# Patient Record
Sex: FEMALE | Race: WHITE | Employment: FULL TIME | URBAN - METROPOLITAN AREA
[De-identification: names, ages, dates, MRNs, and addresses within clinical notes are randomized per-mention and may not be internally consistent; named-entity substitution may affect disease eponyms.]

---

## 2022-04-12 DIAGNOSIS — Z78.0 MENOPAUSE: Primary | ICD-10-CM

## 2022-09-19 PROBLEM — Z87.42 HISTORY OF ABNORMAL CERVICAL PAP SMEAR: Status: ACTIVE | Noted: 2022-09-19

## 2022-09-19 PROBLEM — F41.9 ANXIETY: Status: ACTIVE | Noted: 2022-09-19

## 2022-09-19 PROBLEM — R32 URINARY INCONTINENCE: Status: ACTIVE | Noted: 2022-09-19

## 2022-09-19 PROBLEM — Z79.890 HORMONE REPLACEMENT THERAPY (HRT): Status: ACTIVE | Noted: 2022-09-19

## 2022-09-19 PROBLEM — A63.0 GENITAL WARTS: Status: ACTIVE | Noted: 2022-09-19

## 2022-09-19 PROBLEM — F32.A DEPRESSION: Status: ACTIVE | Noted: 2022-09-19

## 2022-09-19 RX ORDER — PANTOPRAZOLE SODIUM 40 MG/1
40 TABLET, DELAYED RELEASE ORAL 2 TIMES DAILY
COMMUNITY
Start: 2022-03-31

## 2022-09-19 RX ORDER — CLONAZEPAM 1 MG/1
1 TABLET ORAL NIGHTLY
COMMUNITY
Start: 2022-03-31

## 2022-09-19 RX ORDER — ESTRADIOL 1 MG/1
1 TABLET ORAL DAILY
COMMUNITY
Start: 2022-03-31

## 2022-09-19 RX ORDER — SERTRALINE HYDROCHLORIDE 100 MG/1
100 TABLET, FILM COATED ORAL 2 TIMES DAILY
COMMUNITY
Start: 2022-03-31

## 2022-09-19 RX ORDER — TRAMADOL HYDROCHLORIDE 50 MG/1
TABLET ORAL
COMMUNITY
Start: 2022-08-25

## 2022-09-19 RX ORDER — PROGESTERONE 100 MG/1
100 CAPSULE ORAL DAILY
COMMUNITY
Start: 2022-03-31

## 2022-09-19 RX ORDER — MIRTAZAPINE 30 MG/1
TABLET, FILM COATED ORAL
COMMUNITY
Start: 2022-07-20 | End: 2022-09-20

## 2022-09-19 RX ORDER — SUMATRIPTAN SUCCINATE 100 MG/1
TABLET ORAL
COMMUNITY
Start: 2019-09-19

## 2022-09-19 RX ORDER — OXCARBAZEPINE 150 MG/1
150 TABLET, FILM COATED ORAL 2 TIMES DAILY
COMMUNITY
Start: 2022-03-31

## 2022-09-19 NOTE — PROGRESS NOTES
NEW PATIENT-  WELL WOMAN (menopausal since 2017)  Patient Care Team:  SABRINA Mejia as PCP - General  Dima Meier III, MD (Psychiatry)    HPI:  Patient is a 56 y.o. who presents for her well woman exam today.  History reviewed with patient.   Patient also has additional concerns she wants to discuss at this visit (see additional problem note below)    HRT:  combination HRT  HX ABNL PAPS: had LEEP yrs ago but normal since-    REVIEW OF PRIOR DATA/ HEALTH MAINTENANCE:  LAST ANNUAL/ PAP:   more than a year  LAST LABS- recently with PCP   LAST MMG- - more than a yr   LAST COLONOSCOPY- - by  out of town  -q 5 yrs for polyps   LAST DEXA- never     Past Medical History:   Diagnosis Date    Anxiety 1968    Experienced anxiety as a child because of parents marital issues.    Depression 1968    See anxiety comment.    Genital warts     Contracted from boyfriend    History of abnormal cervical Pap smear     Hormone replacement therapy (HRT)     Menopausal state 2016    Urinary incontinence      SURGICAL HX:   has a past surgical history that includes Cervix lesion destruction (); Augmentation of breast ();  section (3); and Tubal ligation ().    SOCIAL HX:    reports that she has never smoked. She has never used smokeless tobacco. She reports current alcohol use of about 3.0 standard drinks per week. She reports that she does not use drugs.    FAMILY HX:   family history includes Breast cancer in her paternal grandmother; Cancer in her father; Diabetes in her father; Endometrial cancer in her mother; Heart failure in her father; Hypertension in her father; Stroke in her father. .    ALLERGIES:  Sulfa (sulfonamide antibiotics)    Current Outpatient Medications:     clonazePAM (KLONOPIN) 1 MG tablet, Take 1 mg by mouth every evening., Disp: , Rfl:     estradioL (ESTRACE) 1 MG tablet, Take 1 mg by mouth once daily., Disp: , Rfl:     OXcarbazepine (TRILEPTAL)  "150 MG Tab, Take 150 mg by mouth 2 (two) times daily., Disp: , Rfl:     pantoprazole (PROTONIX) 40 MG tablet, Take 40 mg by mouth 2 (two) times daily., Disp: , Rfl:     progesterone (PROMETRIUM) 100 MG capsule, Take 100 mg by mouth once daily., Disp: , Rfl:     sertraline (ZOLOFT) 100 MG tablet, Take 100 mg by mouth 2 (two) times daily., Disp: , Rfl:     sumatriptan (IMITREX) 100 MG tablet, , Disp: , Rfl:     traMADoL (ULTRAM) 50 mg tablet, , Disp: , Rfl:     mirtazapine (REMERON) 15 MG tablet, TAKE ONE TABLET BY MOUTH AT BEDTIME WITH THE 30MG, Disp: , Rfl:     ROS:  CONST:  No fever, chills, fatigue +weight gain +hot flashes  CV: No chest pain or palpitations  RESP:  No shortness of breath or cough  GI: No abd pain, vomiting, diarrhea, blood in stool, or changes in bowel mvmts  SKIN: No rashes or lesions  MUSCULOSKELETAL: No joint swelling or pain  PSYCH: No changes in mood or insomia  BREASTS: No asymmetry, lumps, pain, nipple discharge, or skin changes  :  No dysuria, urgency, frequency, hematuria or incontinence          No vag dc, itching, odor or dryness        +ovary pain +pmb    VITALS:  Blood pressure (!) 142/90, height 5' 5" (1.651 m), weight 73 kg (161 lb).  Body mass index is 26.79 kg/m².     PHYSICAL EXAM-  APPEARANCE: Well appearing, in no acute distress.  NECK: Neck symmetric without masses or thyromegaly.  CV:  Normal rate  PULM: Normal resp rate, no resp distress, normal resp effort  PSYCH:  Normal mood and affect, cooperative  SKIN: No rashes, lesions, or abnormal bruising  LYMPH: No inguinal or axillary adenopathy  ABD: Soft, without tenderness or masses. No palpable hernias or hsm  BREASTS: Symmetrical, no skin changes or lesions. No palpable masses, tenderness, or nipple dc  PELVIC:  VULVA: No lesions. Normal female genitalia.  URETHRAL MEATUS: No masses, no prolapse.  BLADDER/ URETHRA: No masses or suprapubic tenderness  VAGINA: No discharge, erythema, or lesions. No significant cystocele or " rectocele. +atrophic changes   CVX: No lesions,no cervical motion tenderness.  UTERUS: Normal size/shape, mobile, non-tender  ADNEXA: No masses, tenderness, or fullness.  ANUS/ PERINEUM: Normal tone.  No lesions.  *female chaperone present for entire exam    ASSESSMENT and PLAN:    Encounter for well woman exam with routine gynecological exam  -     Liquid-based pap smear, screening    Breast cancer screening by mammogram  -     Mammo Digital Screening Bilat w/ Eddie; Future; Expected date: 10/04/2022    FOLLOWUP:  1 year for wwe or sooner prn    COUNSELING:  Patient was counseled today on recommendation for yearly pelvic exam, current Pap guidelines, self breast exams, annual screening mammograms, routine screening colonoscopy , and bone density testing.  Discussed vitamin D and calcium supplements as well as weight bearing exercise to decrease risks. Encouraged patient to see her PCP for other health maintenance.    PROBLEM VISIT:  Problem HPI/ROS:      pt reports her ovaries are hurting x 1 year and probably should have come to dr a long time ago. Used to be just her left but now both sides and getting worse. Worse at night and often wakes her up from sleep. Nothing makes better or worse , but tramadol prn improved it a little. Stabbing and dull pain that is there most of the time       Had pmb 2021 for 4 days right after her dad  but had been menopausal since 2017 and no other bleeding since menopause. Was normal flow and none since.    Having difficulty stopping weight gain and also with decreased libido since menopause.Friend on thryoid meds and wants to try those but advised we need to check bloodwork first to evaluate it.            Problem PHYS EXAM:  (pertinent findings noted in PHYS EXAM above)     Problem ASSESMENT and PLAN:  Hormone replacement therapy (HRT)    Menopause?decreased libido  -     Follicle Stimulating Hormone; Future; Expected date: 2022  -     Luteinizing Hormone; Future;  Expected date: 09/20/2022  -     Antimullerian Hormone (AMH); Future; Expected date: 09/20/2022    Abnormal weight gain  -     TSH; Future; Expected date: 09/20/2022  -     T4, Free; Future; Expected date: 09/20/2022  -     Hemoglobin A1C; Future; Expected date: 09/20/2022  -     DIEUDONNE by IFA, w/Rflx; Future; Expected date: 09/20/2022  -     Vitamin D; Future; Expected date: 09/20/2022    Pelvic pain  -     US OB/GYN Procedure ([facility]); Future; Expected date: 09/20/2022    Postmenopausal bleeding  -     US OB/GYN Procedure ([facility]); Future; Expected date: 09/20/2022    *requesting labs from pcp-request signed and fax sent    *Total time spent: 40 min. 50 % or more was spent on counseling about diagnosis, treatment options, and coordination of care.

## 2022-09-20 ENCOUNTER — PROCEDURE VISIT (OUTPATIENT)
Dept: OBSTETRICS AND GYNECOLOGY | Facility: CLINIC | Age: 57
End: 2022-09-20
Payer: COMMERCIAL

## 2022-09-20 ENCOUNTER — OFFICE VISIT (OUTPATIENT)
Dept: OBSTETRICS AND GYNECOLOGY | Facility: CLINIC | Age: 57
End: 2022-09-20
Payer: COMMERCIAL

## 2022-09-20 VITALS
DIASTOLIC BLOOD PRESSURE: 90 MMHG | SYSTOLIC BLOOD PRESSURE: 142 MMHG | BODY MASS INDEX: 26.82 KG/M2 | HEIGHT: 65 IN | WEIGHT: 161 LBS

## 2022-09-20 DIAGNOSIS — R10.2 PELVIC PAIN: ICD-10-CM

## 2022-09-20 DIAGNOSIS — Z12.31 BREAST CANCER SCREENING BY MAMMOGRAM: ICD-10-CM

## 2022-09-20 DIAGNOSIS — N95.0 POSTMENOPAUSAL BLEEDING: ICD-10-CM

## 2022-09-20 DIAGNOSIS — Z01.419 ENCOUNTER FOR WELL WOMAN EXAM WITH ROUTINE GYNECOLOGICAL EXAM: ICD-10-CM

## 2022-09-20 DIAGNOSIS — Z79.890 HORMONE REPLACEMENT THERAPY (HRT): Primary | ICD-10-CM

## 2022-09-20 DIAGNOSIS — Z78.0 MENOPAUSE: ICD-10-CM

## 2022-09-20 DIAGNOSIS — R63.5 ABNORMAL WEIGHT GAIN: ICD-10-CM

## 2022-09-20 DIAGNOSIS — R68.82 DECREASED LIBIDO: ICD-10-CM

## 2022-09-20 DIAGNOSIS — Z87.42 HISTORY OF ABNORMAL CERVICAL PAP SMEAR: ICD-10-CM

## 2022-09-20 PROCEDURE — 1159F MED LIST DOCD IN RCRD: CPT | Mod: CPTII,S$GLB,, | Performed by: OBSTETRICS & GYNECOLOGY

## 2022-09-20 PROCEDURE — 3008F PR BODY MASS INDEX (BMI) DOCUMENTED: ICD-10-PCS | Mod: CPTII,S$GLB,, | Performed by: OBSTETRICS & GYNECOLOGY

## 2022-09-20 PROCEDURE — 99386 PR PREVENTIVE VISIT,NEW,40-64: ICD-10-PCS | Mod: S$GLB,,, | Performed by: OBSTETRICS & GYNECOLOGY

## 2022-09-20 PROCEDURE — 3080F DIAST BP >= 90 MM HG: CPT | Mod: CPTII,S$GLB,, | Performed by: OBSTETRICS & GYNECOLOGY

## 2022-09-20 PROCEDURE — 3008F BODY MASS INDEX DOCD: CPT | Mod: CPTII,S$GLB,, | Performed by: OBSTETRICS & GYNECOLOGY

## 2022-09-20 PROCEDURE — 99204 PR OFFICE/OUTPT VISIT, NEW, LEVL IV, 45-59 MIN: ICD-10-PCS | Mod: 25,S$GLB,, | Performed by: OBSTETRICS & GYNECOLOGY

## 2022-09-20 PROCEDURE — 1159F PR MEDICATION LIST DOCUMENTED IN MEDICAL RECORD: ICD-10-PCS | Mod: CPTII,S$GLB,, | Performed by: OBSTETRICS & GYNECOLOGY

## 2022-09-20 PROCEDURE — 3080F PR MOST RECENT DIASTOLIC BLOOD PRESSURE >= 90 MM HG: ICD-10-PCS | Mod: CPTII,S$GLB,, | Performed by: OBSTETRICS & GYNECOLOGY

## 2022-09-20 PROCEDURE — 76830 TRANSVAGINAL US NON-OB: CPT | Mod: S$GLB,,, | Performed by: OBSTETRICS & GYNECOLOGY

## 2022-09-20 PROCEDURE — 3077F PR MOST RECENT SYSTOLIC BLOOD PRESSURE >= 140 MM HG: ICD-10-PCS | Mod: CPTII,S$GLB,, | Performed by: OBSTETRICS & GYNECOLOGY

## 2022-09-20 PROCEDURE — 99204 OFFICE O/P NEW MOD 45 MIN: CPT | Mod: 25,S$GLB,, | Performed by: OBSTETRICS & GYNECOLOGY

## 2022-09-20 PROCEDURE — 99386 PREV VISIT NEW AGE 40-64: CPT | Mod: S$GLB,,, | Performed by: OBSTETRICS & GYNECOLOGY

## 2022-09-20 PROCEDURE — 76830 US OB/GYN PROCEDURE (VIEWPOINT): ICD-10-PCS | Mod: S$GLB,,, | Performed by: OBSTETRICS & GYNECOLOGY

## 2022-09-20 PROCEDURE — 3077F SYST BP >= 140 MM HG: CPT | Mod: CPTII,S$GLB,, | Performed by: OBSTETRICS & GYNECOLOGY

## 2022-09-20 RX ORDER — MIRTAZAPINE 15 MG/1
TABLET, FILM COATED ORAL
COMMUNITY
Start: 2022-09-16

## 2022-09-24 LAB — Lab: NORMAL

## 2022-09-26 NOTE — PROGRESS NOTES
Please let patient know her pap and hpv are both negative    Please encourage her to set up the [facility]